# Patient Record
Sex: MALE | Race: WHITE
[De-identification: names, ages, dates, MRNs, and addresses within clinical notes are randomized per-mention and may not be internally consistent; named-entity substitution may affect disease eponyms.]

---

## 2018-03-20 ENCOUNTER — HOSPITAL ENCOUNTER (OUTPATIENT)
Dept: HOSPITAL 92 - RAD | Age: 68
Discharge: HOME | End: 2018-03-20
Attending: FAMILY MEDICINE
Payer: COMMERCIAL

## 2018-03-20 DIAGNOSIS — R13.13: Primary | ICD-10-CM

## 2018-03-20 DIAGNOSIS — K44.9: ICD-10-CM

## 2018-03-20 DIAGNOSIS — K22.5: ICD-10-CM

## 2018-03-20 PROCEDURE — 74220 X-RAY XM ESOPHAGUS 1CNTRST: CPT

## 2018-03-20 NOTE — RAD
ESOPHOGRAM:

 

Date: 3-20-18 

 

History: Pharyngeal dysphagia. 

 

FINDINGS: 

There is mild decreased in primary esophageal peristalsis with mild tertiary contractions seen during
 the exam. There is a small outpouching of contrast at the right lateral aspect of the distal esophag
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 No focal narrowing is seen throughout the esophagus.
 A 12.5 mm barium tablet was administered during the exam which traverses the GE junction with only m
inimal transient holdup. There is a small sliding type hiatal hernia demonstrated. No gastroesophagea
l reflux was seen during the exam. No mucosal irregularity is seen in the esophagus. 

 

 chest x-ray demonstrates that the lungs are clear and cardiac silhouette and pulmonary vasculat
ure are within normal limits. 

 

IMPRESSION: 

1. 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
 epiphrenic diverticulum. 

2. Small sliding type hiatal hernia. 

3. No evidence of gastroesophageal reflux. 

4. Mild decrease in esophageal primary peristalsis with mild tertiary contractions. 

 

POS: [facility]

## 2019-08-02 ENCOUNTER — HOSPITAL ENCOUNTER (OUTPATIENT)
Dept: HOSPITAL 92 - SCSMRI | Age: 69
Discharge: HOME | End: 2019-08-02
Attending: FAMILY MEDICINE
Payer: COMMERCIAL

## 2019-08-02 DIAGNOSIS — M48.07: ICD-10-CM

## 2019-08-02 DIAGNOSIS — M48.061: ICD-10-CM

## 2019-08-02 DIAGNOSIS — M47.26: Primary | ICD-10-CM

## 2019-08-02 PROCEDURE — 72148 MRI LUMBAR SPINE W/O DYE: CPT

## 2019-08-02 NOTE — MRI
MRI lumbar spine noncontrast:



HISTORY:

Chronic back pain, radiating of left-sided



COMPARISON:

None



FINDINGS:



Appropriate T1 marrow signal intensity of the lumbar vertebra. Vertebral body height is maintained. N
o fracture. No significant STIR hyperintensity to suggest vertebral body edema or ligamentous

injury.

Appropriate signal intensity of the paraspinal muscles

T2 hyperintensity emanating from the left kidney, incompletely evaluated measuring 8.6 cm.

Conus medullaris terminates at the inferior aspect of T12



T12-L1:No significant central canal stenosis or neural foraminal narrowing



L1-L2:Adequate disc hydration. No significant central canal stenosis or neural foramina narrowing



L2-L3:Interval disc desiccation without significant loss of disc space height. Minimal left and  righ
t paracentral disc bulges. No significant central canal stenosis or neural foraminal narrowing



L3-L4:Minimal disc desiccation. Broad-based disc bulge and ligamentum flavum thickening  do not cause
 any significant central canal stenosis. Mild bilateral foraminal narrowing



L4-L5:Desiccation with moderate loss of disc space height. There is a central/left subarticular disc 
protrusion. Mild central canal stenosis. Disc material encroaches upon and partially obscure the

traversing left L5 nerve root. Disc material encroaches upon but does not obscure the traversing righ
t L5 nerve root. Mild to moderate bilateral foraminal narrowing.



L5-S1:Severe loss of disc space height. Posterior disc protrusion. Disc material abuts the ventral th
ecal sac without significant deformity stenosis. There is disc material in both subarticular zones,

right greater than left. There is mass effect and partial obscuration the traversing right S1 nerve r
oot. Disc material abuts but does not obscure the traversing left root. Mild to moderate right

foraminal narrowing. Left neural foramen is patent



IMPRESSION:

Degenerative changes lumbar spine as described above. There is no evidence of high-grade central caterina
l stenosis or high-grade neural foraminal narrowing. There is narrowing of the left subareolar zone

at L4-L5 with partial obscuration traversing left L5 nerve root. There is narrowing of the right suba
reolar zone at L5-S1. Disc material abuts and partially fused the traversing right S1 nerve



Transcribed Date/Time: 8/2/2019 3:23 PM



Reported By: Giuliana Colon 

Electronically Signed:  8/2/2019 4:02 PM

## 2019-10-02 ENCOUNTER — HEALTH MAINTENANCE LETTER (OUTPATIENT)
Age: 69
End: 2019-10-02

## 2019-12-16 ENCOUNTER — HEALTH MAINTENANCE LETTER (OUTPATIENT)
Age: 69
End: 2019-12-16

## 2021-01-15 ENCOUNTER — HEALTH MAINTENANCE LETTER (OUTPATIENT)
Age: 71
End: 2021-01-15

## 2021-09-04 ENCOUNTER — HEALTH MAINTENANCE LETTER (OUTPATIENT)
Age: 71
End: 2021-09-04

## 2022-02-19 ENCOUNTER — HEALTH MAINTENANCE LETTER (OUTPATIENT)
Age: 72
End: 2022-02-19

## 2022-10-22 ENCOUNTER — HEALTH MAINTENANCE LETTER (OUTPATIENT)
Age: 72
End: 2022-10-22

## 2023-04-01 ENCOUNTER — HEALTH MAINTENANCE LETTER (OUTPATIENT)
Age: 73
End: 2023-04-01